# Patient Record
(demographics unavailable — no encounter records)

---

## 2025-06-06 NOTE — HISTORY OF PRESENT ILLNESS
[Snoring] : snoring [Witnessed Apneas] : witnessed apneas [Home] : home [TextBox_4] : 40-year-old female with distant history of tobacco use seen today for follow-up of her complaints of dyspnea.  Patient did undergo cardiac workup which was unremarkable and her last visit her complaints were felt to be mostly on the basis of post pregnancy weight gain with deconditioning as well as possible reactive airways disease trial of albuterol was provided.  Has continued to have episodes predominantly with work.  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  In addition the patient has had a history of reflux with esophagitis but has not been maintained on either H2 blockers or proton pump inhibitors.  No baseline complaints of shortness of breath.  No significant change in weight.  Was not compliant with albuterol trial   [TextBox_13] : 6 [YearQuit] : 2018 [TextBox_77] : 9438 [TextBox_79] : 6401 [TextBox_81] : 5 [TextBox_89] : 0 [TextBox_93] : gasping with sleep, intermittently insomnia reads  [TextBox_100] : 9/16/2024 [TextBox_108] : 2.1 [ESS] : 10

## 2025-06-06 NOTE — DISCUSSION/SUMMARY
[FreeTextEntry1] : 40-year-old female seen today in follow-up for the above.  Patient is complaints of dyspnea may be due to anxiety but reactive airways still remains within the differential diagnosis.  I once again restarted the albuterol and of also treating the patient for reflux with an H2 blocker.  Follow-up spirometry in 6 months.  No evidence of obstructive sleep apnea